# Patient Record
Sex: MALE | Race: BLACK OR AFRICAN AMERICAN
[De-identification: names, ages, dates, MRNs, and addresses within clinical notes are randomized per-mention and may not be internally consistent; named-entity substitution may affect disease eponyms.]

---

## 2019-10-15 ENCOUNTER — HOSPITAL ENCOUNTER (OUTPATIENT)
Dept: HOSPITAL 84 - D.OPS | Age: 49
Discharge: HOME | End: 2019-10-15
Attending: SURGERY
Payer: COMMERCIAL

## 2019-10-15 VITALS — WEIGHT: 315 LBS | HEIGHT: 71 IN | BODY MASS INDEX: 44.1 KG/M2

## 2019-10-15 VITALS — SYSTOLIC BLOOD PRESSURE: 140 MMHG | DIASTOLIC BLOOD PRESSURE: 76 MMHG

## 2019-10-15 DIAGNOSIS — D50.9: Primary | ICD-10-CM

## 2019-10-15 DIAGNOSIS — E66.9: ICD-10-CM

## 2019-10-15 LAB
ANION GAP SERPL CALC-SCNC: 9.5 MMOL/L (ref 8–16)
BASOPHILS NFR BLD AUTO: 0.7 % (ref 0–2)
BUN SERPL-MCNC: 15 MG/DL (ref 7–18)
CALCIUM SERPL-MCNC: 8.3 MG/DL (ref 8.5–10.1)
CHLORIDE SERPL-SCNC: 109 MMOL/L (ref 98–107)
CO2 SERPL-SCNC: 29.4 MMOL/L (ref 21–32)
CREAT SERPL-MCNC: 1.1 MG/DL (ref 0.6–1.3)
EOSINOPHIL NFR BLD: 2.4 % (ref 0–7)
ERYTHROCYTE [DISTWIDTH] IN BLOOD BY AUTOMATED COUNT: 15.1 % (ref 11.5–14.5)
GLUCOSE SERPL-MCNC: 87 MG/DL (ref 74–106)
HCT VFR BLD CALC: 32.8 % (ref 42–54)
HGB BLD-MCNC: 11.3 G/DL (ref 13.5–17.5)
IMM GRANULOCYTES NFR BLD: 0.2 % (ref 0–5)
LYMPHOCYTES NFR BLD AUTO: 41.6 % (ref 15–50)
MCH RBC QN AUTO: 26.5 PG (ref 26–34)
MCHC RBC AUTO-ENTMCNC: 34.5 G/DL (ref 31–37)
MCV RBC: 77 FL (ref 80–100)
MONOCYTES NFR BLD: 13.6 % (ref 2–11)
NEUTROPHILS NFR BLD AUTO: 41.5 % (ref 40–80)
OSMOLALITY SERPL CALC.SUM OF ELEC: 286 MOSM/KG (ref 275–300)
PLATELET # BLD: 196 10X3/UL (ref 130–400)
PMV BLD AUTO: 10.2 FL (ref 7.4–10.4)
POTASSIUM SERPL-SCNC: 3.9 MMOL/L (ref 3.5–5.1)
RBC # BLD AUTO: 4.26 10X6/UL (ref 4.2–6.1)
SODIUM SERPL-SCNC: 144 MMOL/L (ref 136–145)
WBC # BLD AUTO: 5.7 10X3/UL (ref 4.8–10.8)

## 2019-10-15 NOTE — HP
PATIENT: MARICEL ASCENCIO                                MEDICAL RECORD: [id number]
ACCOUNT: [id number]                                    LOCATION:D.OPS         
: [birth year]                                            ADMISSION DATE: 10/15/19
                                                         PCP: No PCP                 
 
                             HISTORY AND PHYSICAL EXAMINATION
 
 
CHIEF COMPLAINT:  Anemia.
 
HISTORY OF PRESENT ILLNESS:  The patient has iron deficiency anemia.  He has
noted no blood in the stool.  He has never undergone a colonoscopy in the past. 
No family history of colon cancer.  No abdominal pain.  He does not know if
stool has been checked for occult blood or not.  He is here for a colonoscopy
and just a colonoscopy today.  A consultation request form did not accompany the
patient today.
 
ALLERGIES:  No known drug allergies.
 
MEDICATIONS:  At the [facility], see the nurse notes.
 
SOCIAL HISTORY:  Nonsmoker.
 
PAST MEDICAL AND SURGICAL HISTORY:  Hypertension, congestive heart failure,
morbid obesity, arthritis.
 
PHYSICAL EXAMINATION:
GENERAL:  The patient does not appear acutely ill.  He does not appear
chronically ill.
VITAL SIGNS:  Reviewed.
EARS:  External ears appear normal.
EYES:  Extraocular movements are intact.
NECK:  Trachea is midline.
CHEST:  No intercostal retractions.
PULMONARY:  Nonlabored, no stridor.
 
IMPRESSION:
1.  Iron deficiency anemia.
2.  Desires colonoscopy.
 
PLAN:  Colonoscopy.
 
TRANSINT:[id number] Voice Confirmation ID: [id number] DOCUMENT ID: [id number]
 
 
                                           
                                           STEVE RANDHAWA MD            
 
 
 
Electronically Signed by STEVE RANDHAWA on 10/15/19 at 1635
CC:                                                             [id number]
DICTATION DATE: 10/15/19 1433     :     10/15/19 1454      [facility] 
                                                                      10/15/19
[id number] [place] [address]

## 2019-10-17 NOTE — OP
PATIENT NAME:  MARICEL ASCENCIO                          MEDICAL RECORD: [id number]
:[birth year]                                             LOCATION:D.OPS          
                                                         ADMISSION DATE:        
SURGEON:  STEVE RANDHAWA MD            
 
 
DATE OF OPERATION:  10/15/2019
 
PREOPERATIVE DIAGNOSES:
1.  Iron deficiency anemia.
2.  Hyper-obesity.
 
POSTOPERATIVE DIAGNOSES:
1.  Iron deficiency anemia.
2.  Hyper-obesity.
3.  Poor (inadequate) colonic prep.
 
PROCEDURE:  Total colonoscopy to cecum.
 
SURGEON:  Steve Randhawa MD
 
ASSISTANT:  None.
 
BLOOD LOSS:  Minimal.
 
ANESTHESIA:  IV sedation.
 
COMPLICATIONS:  None.
 
This patient is being done in the hospital and actually even in the operating
room due to his size.  We deemed him too risky to perform an endoscopy on him at
[facility].
 
The patient states that he does not know if occult blood has been found in his
stool.  He states that he took his prep.  The guard that was with him states
that he has only had 1 bowel movement all day long.  This procedure took place
after 2 p.m. in the afternoon.
 
OPERATIVE COURSE:  The patient was conveyed to the operating room electively on
10/15/2019.  IV sedation was induced by the anesthesia staff.  The patient was
placed in the Abdi position.  A digital rectal examination was performed.  A
colonoscope was inserted through the anus.  It was easily advanced to the cecum.
 There was a lot of very thick fecal material.  I irrigated and aspirated
extensively.  I could not get most of the fecal material to aspirate out;
however.  My view of the colonic walls was a remarkably diminished.  This is
essentially nondiagnostic colonoscopy.
 
I withdrew the scope under direct vision.
 
The patient was then conveyed back to the outpatient department.
 
My recommendation is that the patient undergo a 2-day prep.  Due to the anemia
and due to his body habitus, my recommendation is that we perform a colonoscopy
again here at the hospital and if the colonoscopy is negative for any potential
bleeding source then we immediately proceed with an EGD in order to prevent
another trip to the hospital.
 
TRANSINT:[id number] Voice Confirmation ID: [id number] DOCUMENT ID: [id number]
 
 
 
OPERATIVE REPORT                               [id number]    MARICEL ASCENCIO        
 
 
cc: Leatha FELIPE [phone]
                                           
                                           STEVE RANDHAWA MD            
 
 
 
Electronically Signed by STEVE RANDHAWA on 10/17/19 at 8471
 
 
 
 
 
 
 
 
 
 
 
 
 
 
 
 
 
 
 
 
 
 
 
 
 
 
 
 
 
 
 
 
 
 
 
 
 
 
 
 
CC: LEATHA BARTLETT                                              [phone]
DICTATION DATE: 10/15/19 1653     :     10/15/19 1952      [facility] 
                                                                      10/15/19
[id number] [place] [address]